# Patient Record
Sex: FEMALE | Race: WHITE | URBAN - METROPOLITAN AREA
[De-identification: names, ages, dates, MRNs, and addresses within clinical notes are randomized per-mention and may not be internally consistent; named-entity substitution may affect disease eponyms.]

---

## 2022-12-16 ENCOUNTER — OFFICE VISIT (OUTPATIENT)
Dept: URBAN - METROPOLITAN AREA CLINIC 91 | Facility: CLINIC | Age: 52
End: 2022-12-16
Payer: OTHER GOVERNMENT

## 2022-12-16 DIAGNOSIS — G43.119 MIGRAINE WITH AURA, INTRACTABLE, WITHOUT STATUS MIGRAINOSUS: Primary | ICD-10-CM

## 2022-12-16 PROCEDURE — 99203 OFFICE O/P NEW LOW 30 MIN: CPT | Performed by: OPHTHALMOLOGY

## 2022-12-16 ASSESSMENT — INTRAOCULAR PRESSURE
OD: 21
OS: 22

## 2022-12-16 NOTE — IMPRESSION/PLAN
Impression: Migraine with aura, intractable, without status migrainosus: G43.119. Plan: Patient had symptoms and headache consistent with non-complicated migraine headache disorder. She notes past episodes usually associated with stress.   Should these persist then she should follow up with her PCP for headache management

## 2024-04-24 ENCOUNTER — OFFICE VISIT (OUTPATIENT)
Dept: URBAN - METROPOLITAN AREA CLINIC 91 | Facility: CLINIC | Age: 54
End: 2024-04-24
Payer: COMMERCIAL

## 2024-04-24 DIAGNOSIS — G43.119 MIGRAINE WITH AURA, INTRACTABLE, WITHOUT STATUS MIGRAINOSUS: Primary | ICD-10-CM

## 2024-04-24 PROCEDURE — 99213 OFFICE O/P EST LOW 20 MIN: CPT | Performed by: SPECIALIST

## 2024-04-24 ASSESSMENT — INTRAOCULAR PRESSURE
OS: 18
OD: 17